# Patient Record
Sex: MALE | Race: WHITE
[De-identification: names, ages, dates, MRNs, and addresses within clinical notes are randomized per-mention and may not be internally consistent; named-entity substitution may affect disease eponyms.]

---

## 2018-07-04 ENCOUNTER — HOSPITAL ENCOUNTER (EMERGENCY)
Dept: HOSPITAL 62 - ER | Age: 53
Discharge: HOME | End: 2018-07-04
Payer: COMMERCIAL

## 2018-07-04 VITALS — DIASTOLIC BLOOD PRESSURE: 77 MMHG | SYSTOLIC BLOOD PRESSURE: 146 MMHG

## 2018-07-04 DIAGNOSIS — Z95.1: ICD-10-CM

## 2018-07-04 DIAGNOSIS — I10: ICD-10-CM

## 2018-07-04 DIAGNOSIS — Z88.0: ICD-10-CM

## 2018-07-04 DIAGNOSIS — I25.10: ICD-10-CM

## 2018-07-04 DIAGNOSIS — Z95.5: ICD-10-CM

## 2018-07-04 DIAGNOSIS — Z95.810: ICD-10-CM

## 2018-07-04 DIAGNOSIS — R10.9: Primary | ICD-10-CM

## 2018-07-04 DIAGNOSIS — F17.210: ICD-10-CM

## 2018-07-04 LAB
ADD MANUAL DIFF: NO
ALBUMIN SERPL-MCNC: 4.2 G/DL (ref 3.5–5)
ALP SERPL-CCNC: 115 U/L (ref 38–126)
ALT SERPL-CCNC: 42 U/L (ref 21–72)
ANION GAP SERPL CALC-SCNC: 15 MMOL/L (ref 5–19)
APPEARANCE UR: CLEAR
APTT PPP: YELLOW S
AST SERPL-CCNC: 32 U/L (ref 17–59)
BASOPHILS # BLD AUTO: 0 10^3/UL (ref 0–0.2)
BASOPHILS NFR BLD AUTO: 0.4 % (ref 0–2)
BILIRUB DIRECT SERPL-MCNC: 0.4 MG/DL (ref 0–0.4)
BILIRUB SERPL-MCNC: 0.5 MG/DL (ref 0.2–1.3)
BILIRUB UR QL STRIP: NEGATIVE
BUN SERPL-MCNC: 23 MG/DL (ref 7–20)
CALCIUM: 9.5 MG/DL (ref 8.4–10.2)
CHLORIDE SERPL-SCNC: 102 MMOL/L (ref 98–107)
CO2 SERPL-SCNC: 28 MMOL/L (ref 22–30)
EOSINOPHIL # BLD AUTO: 0.2 10^3/UL (ref 0–0.6)
EOSINOPHIL NFR BLD AUTO: 1.8 % (ref 0–6)
ERYTHROCYTE [DISTWIDTH] IN BLOOD BY AUTOMATED COUNT: 14.2 % (ref 11.5–14)
GLUCOSE SERPL-MCNC: 237 MG/DL (ref 75–110)
GLUCOSE UR STRIP-MCNC: 150 MG/DL
HCT VFR BLD CALC: 47.8 % (ref 37.9–51)
HGB BLD-MCNC: 16.6 G/DL (ref 13.5–17)
KETONES UR STRIP-MCNC: NEGATIVE MG/DL
LYMPHOCYTES # BLD AUTO: 1.7 10^3/UL (ref 0.5–4.7)
LYMPHOCYTES NFR BLD AUTO: 19.9 % (ref 13–45)
MCH RBC QN AUTO: 30.9 PG (ref 27–33.4)
MCHC RBC AUTO-ENTMCNC: 34.7 G/DL (ref 32–36)
MCV RBC AUTO: 89 FL (ref 80–97)
MONOCYTES # BLD AUTO: 0.8 10^3/UL (ref 0.1–1.4)
MONOCYTES NFR BLD AUTO: 9.7 % (ref 3–13)
NEUTROPHILS # BLD AUTO: 5.8 10^3/UL (ref 1.7–8.2)
NEUTS SEG NFR BLD AUTO: 68.2 % (ref 42–78)
NITRITE UR QL STRIP: NEGATIVE
PH UR STRIP: 5 [PH] (ref 5–9)
PLATELET # BLD: 120 10^3/UL (ref 150–450)
POTASSIUM SERPL-SCNC: 4.6 MMOL/L (ref 3.6–5)
PROT SERPL-MCNC: 6.9 G/DL (ref 6.3–8.2)
PROT UR STRIP-MCNC: NEGATIVE MG/DL
RBC # BLD AUTO: 5.38 10^6/UL (ref 4.35–5.55)
SODIUM SERPL-SCNC: 145 MMOL/L (ref 137–145)
SP GR UR STRIP: 1.03
TOTAL CELLS COUNTED % (AUTO): 100 %
UROBILINOGEN UR-MCNC: NEGATIVE MG/DL (ref ?–2)
WBC # BLD AUTO: 8.6 10^3/UL (ref 4–10.5)

## 2018-07-04 PROCEDURE — 74177 CT ABD & PELVIS W/CONTRAST: CPT

## 2018-07-04 PROCEDURE — 85025 COMPLETE CBC W/AUTO DIFF WBC: CPT

## 2018-07-04 PROCEDURE — 81001 URINALYSIS AUTO W/SCOPE: CPT

## 2018-07-04 PROCEDURE — 36415 COLL VENOUS BLD VENIPUNCTURE: CPT

## 2018-07-04 PROCEDURE — 96361 HYDRATE IV INFUSION ADD-ON: CPT

## 2018-07-04 PROCEDURE — 96375 TX/PRO/DX INJ NEW DRUG ADDON: CPT

## 2018-07-04 PROCEDURE — 80053 COMPREHEN METABOLIC PANEL: CPT

## 2018-07-04 PROCEDURE — 99284 EMERGENCY DEPT VISIT MOD MDM: CPT

## 2018-07-04 PROCEDURE — S0119 ONDANSETRON 4 MG: HCPCS

## 2018-07-04 PROCEDURE — 96374 THER/PROPH/DIAG INJ IV PUSH: CPT

## 2018-07-04 NOTE — RADIOLOGY REPORT (SQ)
EXAM DESCRIPTION:  CT ABD/PELVIS WITH IV ONLY



COMPLETED DATE/TIME:  7/4/2018 7:30 pm



REASON FOR STUDY:  R flank pain, sudden onset, r/o stones vs AAA



COMPARISON:  None.



TECHNIQUE:  CT scan of the abdomen and pelvis performed using helical scanning technique with dynamic
 intravenous contrast injection.  No oral contrast. Images reviewed with lung, soft tissue, and bone 
windows. Reconstructed coronal and sagittal MPR images reviewed. Delayed images for evaluation of the
 urinary system also acquired. All images stored on PACS.

All CT scanners at this facility use dose modulation, iterative reconstruction, and/or weight based d
osing when appropriate to reduce radiation dose to as low as reasonably achievable (ALARA).

[facility]: Dose Right  [facility]: CareDose    [facility]: Dose Right    [facility]: Teradose 4D    [facility]: Smart Technologies



CONTRAST TYPE AND DOSE:  contrast/concentration: Isovue 370.00 mg/ml; Total Contrast Delivered: 93.0 
ml; Total Saline Delivered: 45.0 ml



RENAL FUNCTION:  BUN 23 creatinine 1.17



RADIATION DOSE:  CT Rad equipment meets quality standard of care and radiation dose reduction techniq
ues were employed. CTDIvol: 10.2 - 14.3 mGy. DLP: 1308 mGy-cm..



LIMITATIONS:  None.



FINDINGS:  LOWER CHEST: No significant findings. No nodules or infiltrates.

LIVER: Normal size. No masses.  No dilated ducts.

SPLEEN: Normal size. No focal lesions.

PANCREAS: No masses. No significant calcifications. No adjacent inflammation or peripancreatic fluid 
collections. Pancreatic duct not dilated.

GALLBLADDER: No identified stones by CT criteria. No inflammatory changes to suggest cholecystitis.

ADRENAL GLANDS: No significant masses or asymmetry.

RIGHT KIDNEY AND URETER: No solid masses.   No significant calcifications.   No hydronephrosis or hyd
roureter.

LEFT KIDNEY AND URETER: No solid masses.   No significant calcifications.   No hydronephrosis or hydr
oureter.

AORTA AND VESSELS: No aneurysm or dissection.  There is mild ectasia of the infrarenal abdominal aort
a with a maximum diameter of 26 mm.

RETROPERITONEUM: No retroperitoneal adenopathy, hemorrhage or masses.

BOWEL AND PERITONEAL CAVITY: No masses or inflammatory changes. No free fluid or peritoneal masses.

APPENDIX: Normal.

PELVIS: No mass.  No free fluid. Normal bladder.

ABDOMINAL WALL: No masses. No hernias.

BONES: No significant or acute findings.

OTHER: No other significant finding.



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  Atherosclerosis.  No aneurysm or dissec
tion.  No acute findings in the abdomen or pelvis.



TECHNICAL DOCUMENTATION:  JOB ID:  [id number]

Quality ID # 436: Final reports with documentation of one or more dose reduction techniques (e.g., Au
tomated exposure control, adjustment of the mA and/or kV according to patient size, use of iterative 
reconstruction technique)

 2011 [employer]- All Rights Reserved



Reading location - IP/workstation name: [email or web address]

## 2018-07-04 NOTE — ER DOCUMENT REPORT
ED GI/





- General


Chief Complaint: Flank Pain


Stated Complaint: FLANK PAIN


Time Seen by Provider: 07/04/18 18:05


Mode of Arrival: Ambulatory


Information source: Patient


TRAVEL OUTSIDE OF THE [place] IN LAST 30 DAYS: No





- HPI


Patient complains to provider of: Flank pain - RIGHT


Onset: This afternoon - 1700


Timing/Duration: Sudden


Quality of pain: Sharp, Stabbing


Severity at maximum: Severe


Severity in ED: Moderate


Context: denies: Bad food, Lifting, Out of the country travel, Pregnant, Recent 

trauma


Location: Right flank





- Related Data


Allergies/Adverse Reactions: 


 





Penicillins Allergy (Verified 07/04/18 18:03)


 











Past Medical History





- General


Information source: Patient





- Social History


Smoking Status: Current Every Day Smoker


Cigarette use (# per day): Yes


Chew tobacco use (# tins/day): No


Frequency of alcohol use: Occasional


Drug Abuse: None


Lives with: Spouse/Significant other


Family History: Reviewed & Not Pertinent


Patient has suicidal ideation: No


Patient has homicidal ideation: No





- Past Medical History


Cardiac Medical History: Reports: Hx Coronary Artery Disease, Hx Hypertension - 

no med


   Denies: Hx Heart Attack


Pulmonary Medical History: Reports: Hx Pneumonia - age 15


   Denies: Hx Asthma, Hx Bronchitis, Hx COPD, Hx Tuberculosis


EENT Medical History: Reports: None


Neurological Medical History: Reports: None.  Denies: Hx Cerebrovascular 

Accident, Hx Seizures


Endocrine Medical History: Reports: None


Renal/ Medical History: Reports: None.  Denies: Hx Kidney Stones, Hx 

Peritoneal Dialysis


Malignancy Medical History: Reports None


GI Medical History: Reports: None


Musculoskeltal Medical History: Reports None, Denies Hx Arthritis


Psychiatric Medical History: Reports: None


Past Surgical History: Reports: Hx Cardiac Catheterization, Hx Coronary Artery 

Bypass Graft, Hx Coronary Stent, Hx Internal Defibrillator.  Denies: Hx 

Pacemaker





- Immunizations


Hx Diphtheria, Pertussis, Tetanus Vaccination: No





Review of Systems





- Review of Systems


Constitutional: No symptoms reported


EENT: No symptoms reported


Cardiovascular: No symptoms reported


Respiratory: No symptoms reported


Gastrointestinal: Nausea


Genitourinary: Flank pain


Musculoskeletal: See HPI


Skin: No symptoms reported


Neurological/Psychological: No symptoms reported





Physical Exam





- Vital signs


Vitals: 


 











Temp Pulse Resp BP Pulse Ox


 


 97.6 F   76   20   169/98 H  98 


 


 07/04/18 18:02  07/04/18 18:02  07/04/18 18:02  07/04/18 18:02  07/04/18 18:02











Interpretation: Hypertensive.  No: Tachycardic, Tachypneic, Febrile





- General


General appearance: Appears well, Alert


In distress: None





- HEENT


Head: Normocephalic


Eyes: Normal


Conjunctiva: Normal


Ears: Normal


Nasal: Normal


Mouth/Lips: Normal


Mucous membranes: Normal





- Respiratory


Respiratory status: No respiratory distress


Breath sounds: Normal





- Cardiovascular


Rhythm: Regular


Heart sounds: Normal auscultation


Murmur: No





- Abdominal


Inspection: Normal


Distension: No distension


Bowel sounds: Hypoactive


Tenderness: Tender - R. FLANK





- Back


Back: Normal, CVA tenderness - RIGHT





- Extremities


General upper extremity: Normal inspection


General lower extremity: Normal inspection





- Neurological


Neuro grossly intact: Yes


Cognition: Normal


Orientation: AAOx4





- Psychological


Associated symptoms: Normal affect, Normal mood





- Skin


Skin Temperature: Warm


Skin Moisture: Dry


Skin Color: Normal


Skin Turgor: Elastic





Course





- Vital Signs


Vital signs: 


 











Temp Pulse Resp BP Pulse Ox


 


 97.6 F   76   20   169/98 H  98 


 


 07/04/18 18:02  07/04/18 18:02  07/04/18 18:02  07/04/18 18:02  07/04/18 18:02














- Laboratory


Result Diagrams: 


 07/04/18 18:09





 07/04/18 18:09


Laboratory results interpreted by me: 


 











  07/04/18 07/04/18 07/04/18





  18:09 18:09 19:50


 


RDW  14.2 H  


 


Plt Count  120 L  


 


BUN   23 H 


 


Glucose   237 H 


 


Urine Glucose (UA)    150 H


 


Urine Blood    MODERATE H














Discharge





- Discharge


Clinical Impression: 


 Flank pain





Condition: Stable


Disposition: HOME, SELF-CARE


Instructions:  Flank Pain ([facility]), Pain Medication Injection ([facility])


Additional Instructions: 


REST, DRINK PLENTY OF FLUIDS.


CONTINUE YOUR USUAL MEDICATIONS.


YOU MAY TAKE YOUR USUAL PAIN MEDICATION IF NEEDED.


FOLLOW UP WITH YOUR PRIMARY CARE PROVIDER OR RETURN TO E.R. IF PROBLEMS.


Referrals: 


JACQUELINE KUMAR MD [Primary Care Provider] - Follow up as needed